# Patient Record
Sex: MALE | Race: WHITE | Employment: STUDENT | URBAN - METROPOLITAN AREA
[De-identification: names, ages, dates, MRNs, and addresses within clinical notes are randomized per-mention and may not be internally consistent; named-entity substitution may affect disease eponyms.]

---

## 2017-09-11 ENCOUNTER — OFFICE VISIT (OUTPATIENT)
Dept: ORTHOPEDIC SURGERY | Age: 22
End: 2017-09-11

## 2017-09-11 ENCOUNTER — TELEPHONE (OUTPATIENT)
Dept: ORTHOPEDIC SURGERY | Age: 22
End: 2017-09-11

## 2017-09-11 VITALS — RESPIRATION RATE: 16 BRPM | HEIGHT: 77 IN | WEIGHT: 235 LBS | BODY MASS INDEX: 27.75 KG/M2

## 2017-09-11 DIAGNOSIS — R52 PAIN: ICD-10-CM

## 2017-09-11 DIAGNOSIS — S52.124A CLOSED NONDISPLACED FRACTURE OF HEAD OF RIGHT RADIUS, INITIAL ENCOUNTER: Primary | ICD-10-CM

## 2017-09-11 PROCEDURE — 73080 X-RAY EXAM OF ELBOW: CPT | Performed by: ORTHOPAEDIC SURGERY

## 2017-09-11 PROCEDURE — 99203 OFFICE O/P NEW LOW 30 MIN: CPT | Performed by: ORTHOPAEDIC SURGERY

## 2017-09-11 PROCEDURE — 24650 CLTX RDL HEAD/NCK FX WO MNPJ: CPT | Performed by: ORTHOPAEDIC SURGERY

## 2017-09-11 ASSESSMENT — ENCOUNTER SYMPTOMS
EYES NEGATIVE: 1
RESPIRATORY NEGATIVE: 1
GASTROINTESTINAL NEGATIVE: 1

## 2017-09-21 ENCOUNTER — OFFICE VISIT (OUTPATIENT)
Dept: ORTHOPEDIC SURGERY | Age: 22
End: 2017-09-21

## 2017-09-21 VITALS — HEIGHT: 77 IN | RESPIRATION RATE: 16 BRPM | WEIGHT: 235 LBS | BODY MASS INDEX: 27.75 KG/M2

## 2017-09-21 DIAGNOSIS — S52.124A CLOSED NONDISPLACED FRACTURE OF HEAD OF RIGHT RADIUS, INITIAL ENCOUNTER: Primary | ICD-10-CM

## 2017-09-21 PROCEDURE — 99024 POSTOP FOLLOW-UP VISIT: CPT | Performed by: ORTHOPAEDIC SURGERY

## 2017-09-21 PROCEDURE — 73080 X-RAY EXAM OF ELBOW: CPT | Performed by: ORTHOPAEDIC SURGERY

## 2017-10-24 ENCOUNTER — OFFICE VISIT (OUTPATIENT)
Dept: ORTHOPEDIC SURGERY | Age: 22
End: 2017-10-24

## 2017-10-24 VITALS — HEIGHT: 77 IN | WEIGHT: 235 LBS | RESPIRATION RATE: 16 BRPM | BODY MASS INDEX: 27.75 KG/M2

## 2017-10-24 DIAGNOSIS — S52.124A CLOSED NONDISPLACED FRACTURE OF HEAD OF RIGHT RADIUS, INITIAL ENCOUNTER: Primary | ICD-10-CM

## 2017-10-24 DIAGNOSIS — R52 PAIN: ICD-10-CM

## 2017-10-24 PROCEDURE — 73080 X-RAY EXAM OF ELBOW: CPT | Performed by: PHYSICIAN ASSISTANT

## 2017-10-24 PROCEDURE — 99024 POSTOP FOLLOW-UP VISIT: CPT | Performed by: PHYSICIAN ASSISTANT

## 2017-10-24 NOTE — PROGRESS NOTES
Review of Systems   Constitutional: Negative. HENT: Negative. Eyes: Negative. Respiratory: Negative. Cardiovascular: Negative. Gastrointestinal: Negative. Genitourinary: Negative. Musculoskeletal: Positive for joint pain. Skin: Negative. Neurological: Negative. Endo/Heme/Allergies: Negative. Psychiatric/Behavioral: Negative. HPI:  Yesenia Olivares is a 25y.o. year old male who is here in follow up for his right radial head fracture. He is doing well, no complaints. Review of history:  HPI:  Yesenia Olivares is a 25y.o. year old male who is here in follow up on right radial head fracture. He is wearing sling full time and states pain is improving. He also states he has full motion now, it just feels stiff with extension. Pain is 1/10 only with supination and pronation, otherwise it is no longer hurting. HPI:  Yesenia Olivares is a 25y.o. year old male who is seen regarding an injury occurring on September 9th. He reports having falling in football game and injuring the Right Arm. He was seen for Emergency evaluation and he was treated with sling and norco.  Symptoms stable over time. The Right  Arm pain assessment is:   Intensity: 7/10   Location:        Elbow,radial side   Description:    aching, pressure and throbbing    He denies any numbness or tingling in the injured extremity. Disorders to the injured extremity prior to this injury: negative for prior surgery, trauma, arthritis or disorders      History reviewed. No pertinent past medical history. History reviewed. No pertinent surgical history. History reviewed. No pertinent family history.     Social History     Social History    Marital status: Single     Spouse name: N/A    Number of children: N/A    Years of education: N/A     Social History Main Topics    Smoking status: Never Smoker    Smokeless tobacco: Never Used    Alcohol use None    Drug use: Unknown    Sexual activity: Not Asked Provider Authentication Statement  Bob Arana PA-C,  personally performed the services described in this documentation and they were scribed in my presence by the above listed scribe.    The documentation is both accurate and complete

## 2018-09-21 ENCOUNTER — OFFICE VISIT (OUTPATIENT)
Dept: ORTHOPEDIC SURGERY | Age: 23
End: 2018-09-21

## 2018-09-21 ENCOUNTER — HOSPITAL ENCOUNTER (OUTPATIENT)
Dept: MRI IMAGING | Age: 23
Discharge: HOME | End: 2018-09-21
Attending: ORTHOPAEDIC SURGERY | Admitting: ORTHOPAEDIC SURGERY

## 2018-09-21 VITALS — RESPIRATION RATE: 12 BRPM | BODY MASS INDEX: 27.16 KG/M2 | HEIGHT: 77 IN | WEIGHT: 230 LBS

## 2018-09-21 DIAGNOSIS — S62.001A CLOSED NONDISPLACED FRACTURE OF SCAPHOID OF RIGHT WRIST, UNSPECIFIED PORTION OF SCAPHOID, INITIAL ENCOUNTER: ICD-10-CM

## 2018-09-21 DIAGNOSIS — R52 PAIN: ICD-10-CM

## 2018-09-21 DIAGNOSIS — S62.001A CLOSED NONDISPLACED FRACTURE OF SCAPHOID OF RIGHT WRIST, UNSPECIFIED PORTION OF SCAPHOID, INITIAL ENCOUNTER: Primary | ICD-10-CM

## 2018-09-21 DIAGNOSIS — S63.501A SPRAIN OF RIGHT WRIST, INITIAL ENCOUNTER: ICD-10-CM

## 2018-09-21 PROCEDURE — 99213 OFFICE O/P EST LOW 20 MIN: CPT | Performed by: ORTHOPAEDIC SURGERY

## 2018-09-21 ASSESSMENT — ENCOUNTER SYMPTOMS
RESPIRATORY NEGATIVE: 1
EYES NEGATIVE: 1
GASTROINTESTINAL NEGATIVE: 1

## 2018-09-21 NOTE — PROGRESS NOTES
Review of Systems   Constitutional: Positive for malaise/fatigue. HENT: Negative. Eyes: Negative. Respiratory: Negative. Cardiovascular: Negative. Gastrointestinal: Negative. Genitourinary: Negative. Musculoskeletal: Positive for joint pain and myalgias. Skin: Negative. Neurological: Negative. Endo/Heme/Allergies: Negative. Psychiatric/Behavioral: Negative. HPI:  Marika Kimble is a 21y.o. year old male who complains of Right wrist.    The Right wrist pain assessment is:   Intensity: 3/10   Location:        radial   Description:    aching and pressure    The symptoms started last night 20 Sep at [id number] football game. 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 The arm was splinted and he is here now to check on the arm. Symptoms improve with rest. The symptoms are worse with activity. The progression of symptoms, overall course: symptoms have progressed to a point and plateaued. Prior to this episode, arm history is: Positive for a radial head fracture last year that was treated nonoperatively        No past medical history on file. No past surgical history on file. No family history on file.     Social History     Social History    Marital status: Single     Spouse name: N/A    Number of children: N/A    Years of education: N/A     Social History Main Topics    Smoking status: Never Smoker    Smokeless tobacco: Never Used    Alcohol use Yes      Comment: occassionally    Drug use: Yes     Types: Marijuana    Sexual activity: Not Asked     Other Topics Concern    None     Social History Narrative    None       Current Outpatient Prescriptions   Medication Sig Dispense Refill    ibuprofen (ADVIL;MOTRIN) 600 MG tablet Take 1 tablet by mouth every 6 hours as needed for Pain 30 tablet 0    ondansetron (ZOFRAN ODT) 4 MG disintegrating tablet Take 1 tablet by mouth every 6 hours as needed for Nausea or Vomiting 10 tablet 0    naproxen (NAPROSYN) 500 MG tablet Take 1 tablet by mouth 2 times daily 20 tablet 0    HYDROcodone-acetaminophen (NORCO) 5-325 MG per tablet Take 1-2 tablets by mouth every 6 hours as needed for Pain . 24 tablet 0     No current facility-administered medications for this visit. No Known Allergies    Review of Systems:  See above      Physical Exam:   Resp 12   Ht 6' 5\" (1.956 m)   Wt 230 lb (104.3 kg)   BMI 27.27 kg/m²        Gait is Normal.   Gen/Psych: Examination reveals a pleasant individual in no acute distress. The patient is oriented to time, place and person. The patient's mood and affect are appropriate.     Lymph: The lymphatic examination bilaterally reveals all areas to be without enlargement or induration.      Skin intact without lymphadenopathy, discoloration, or abnormal temperature.      Vascular: There is intact, symmetric circulation in both upper extremities. right Arm exam:  Sensation is subjectively and objectively normal in the extremity. Muscular strength is clinically appropriate bilaterally.   wrist exam:  -effusion is mild   -moderate tenderness to palpation of  Radial side of the wrist to include distal radius and snuffbox area  -ROM is full range of motion in the shoulder and elbow, limited range of motion in the wrist      Imaging studies:  taken and reviewed  3 views of the right wrist show no fracture, dislocation, swelling or degenerative changes noted except possible nondisplaced fracture of scaphoid      Impression:  right wrist sprain and possible non-displaced scaphoid fracture      Plan:    Thumb spica brace full time  MRI today  Call Dr. Kavon Red to discuss results and further treatment per MRI findings     SHOW THIS FORM TO YOUR /

## 2018-09-24 ENCOUNTER — TELEPHONE (OUTPATIENT)
Dept: ORTHOPEDIC SURGERY | Age: 23
End: 2018-09-24

## 2018-09-24 DIAGNOSIS — S62.034A CLOSED NONDISPLACED FRACTURE OF PROXIMAL THIRD OF SCAPHOID BONE OF RIGHT WRIST, INITIAL ENCOUNTER: Primary | ICD-10-CM

## 2018-09-24 NOTE — TELEPHONE ENCOUNTER
Appt scheduled with Dr. Javier Echeverria office tomorrow for a 10:30 arrival. I called  Conner Barboza at [facility] and informed him of appt time and location. I also reminded him that copies of the insurance cards and Cds of MRI/XR required. I also LMOVM for pt stating that appt scheduled and to call our office back with any questions.

## 2018-10-19 ENCOUNTER — HOSPITAL ENCOUNTER (OUTPATIENT)
Dept: OCCUPATIONAL THERAPY | Age: 23
Setting detail: THERAPIES SERIES
Discharge: HOME | End: 2018-10-19

## 2018-10-19 PROCEDURE — G8985 CARRY GOAL STATUS: HCPCS

## 2018-10-19 PROCEDURE — G8986 CARRY D/C STATUS: HCPCS

## 2018-10-19 PROCEDURE — 97165 OT EVAL LOW COMPLEX 30 MIN: CPT

## 2018-10-19 PROCEDURE — 97530 THERAPEUTIC ACTIVITIES: CPT

## 2018-10-19 PROCEDURE — 97110 THERAPEUTIC EXERCISES: CPT

## 2018-10-19 NOTE — PLAN OF CARE
[x][facility] [address]      ROBBI OROZCO [address]       [facility], [address] [facility]       [address]     [phone] [phone] ONW:[phone]  ________________________________________________________________________    Physician:   Dr Fraser  From: [facility]  Patient: Dana Jimenez      : 1995  Diagnosis:   R scaphoid fx with ORIF  Physician ICD 10 Code:  S62.009A   Treatment Diagnosis:  hand pain  ICD10 tx code: M79.6  Date: 10/19/2018     MRN: [id number]     Wade Valiente Certification/Re-Certification Form  Dear Dr. Gab Amaya  The following patient has been evaluated for occupational therapy services and for therapy to continue, insurance requires physician review of the treatment plan initially and every 90 days. Please review the attached evaluation and/or summary of the patient's plan of care, and verify that you agree therapy should continue by signing the attached document and sending it back to our office. Plan of Care/Treatment to date:  [x] Therapeutic Exercise   [x] Modalities: prn  [x] Therapeutic Activity    [] Ultrasound [] Elec Stimulation   [] Total Motion Release    [] Fluido [] Kinesiotaping  [] Neuromuscular Re-education   [] Ionto [] Coldpack/hotpack   [x] Instruction in HEP    Other:  [x] Manual Therapy     []   [] Aquatic Therapy     [] ? Frequency/Duration:  # Days per week: [] 1 day # Weeks: [] 1 week [] 5 weeks     [x] 2 days? [] 2 weeks [] 6 weeks     [] 3 days   [x] 3 weeks [] 7 weeks     [] 4 days   [x] 4 weeks [] 8 weeks         [] 9 weeks [] 10 weeks         [] 11 weeks [] 12 weeks    Rehab Potential/Progress: [] excellent [x] good [] fair  [] poor       Goals:     Goals   Pt will decrease pain 0-1/10 to increase functional activity tolerance.    Pt will increase AROM of wrist to WNL to increase functional mobility   Scar

## 2019-11-15 ENCOUNTER — OFFICE VISIT (OUTPATIENT)
Dept: ORTHOPEDIC SURGERY | Age: 24
End: 2019-11-15
Payer: COMMERCIAL

## 2019-11-15 VITALS — HEIGHT: 77 IN | WEIGHT: 235 LBS | BODY MASS INDEX: 27.75 KG/M2 | RESPIRATION RATE: 16 BRPM

## 2019-11-15 DIAGNOSIS — S06.0X0A CONCUSSION WITHOUT LOSS OF CONSCIOUSNESS, INITIAL ENCOUNTER: Primary | ICD-10-CM

## 2019-11-15 DIAGNOSIS — S39.92XA LOWER BACK INJURY, INITIAL ENCOUNTER: ICD-10-CM

## 2019-11-15 PROCEDURE — 99213 OFFICE O/P EST LOW 20 MIN: CPT | Performed by: PHYSICIAN ASSISTANT

## 2019-11-15 RX ORDER — IBUPROFEN 800 MG/1
800 TABLET ORAL
Qty: 90 TABLET | Refills: 0 | Status: SHIPPED | OUTPATIENT
Start: 2019-11-15

## 2019-11-15 ASSESSMENT — ENCOUNTER SYMPTOMS
RESPIRATORY NEGATIVE: 1
BACK PAIN: 1
EYES NEGATIVE: 1
GASTROINTESTINAL NEGATIVE: 1